# Patient Record
Sex: MALE | Race: AMERICAN INDIAN OR ALASKA NATIVE
[De-identification: names, ages, dates, MRNs, and addresses within clinical notes are randomized per-mention and may not be internally consistent; named-entity substitution may affect disease eponyms.]

---

## 2018-02-26 ENCOUNTER — HOSPITAL ENCOUNTER (EMERGENCY)
Dept: HOSPITAL 42 - ED | Age: 28
Discharge: HOME | End: 2018-02-26
Payer: COMMERCIAL

## 2018-02-26 VITALS — OXYGEN SATURATION: 100 % | HEART RATE: 65 BPM | SYSTOLIC BLOOD PRESSURE: 124 MMHG | DIASTOLIC BLOOD PRESSURE: 51 MMHG

## 2018-02-26 VITALS — TEMPERATURE: 97.9 F | RESPIRATION RATE: 18 BRPM

## 2018-02-26 DIAGNOSIS — J02.9: Primary | ICD-10-CM

## 2018-02-26 LAB
ALBUMIN SERPL-MCNC: 4.1 G/DL (ref 3–4.8)
ALBUMIN/GLOB SERPL: 1.3 {RATIO} (ref 1.1–1.8)
ALT SERPL-CCNC: 45 U/L (ref 7–56)
AST SERPL-CCNC: 41 U/L (ref 17–59)
BASOPHILS # BLD AUTO: 0.01 K/MM3 (ref 0–2)
BASOPHILS NFR BLD: 0.2 % (ref 0–3)
BUN SERPL-MCNC: 11 MG/DL (ref 7–21)
CALCIUM SERPL-MCNC: 9.7 MG/DL (ref 8.4–10.5)
EOSINOPHIL # BLD: 0.3 10*3/UL (ref 0–0.7)
EOSINOPHIL NFR BLD: 4 % (ref 1.5–5)
ERYTHROCYTE [DISTWIDTH] IN BLOOD BY AUTOMATED COUNT: 12.2 % (ref 11.5–14.5)
GFR NON-AFRICAN AMERICAN: > 60
GRANULOCYTES # BLD: 3.36 10*3/UL (ref 1.4–6.5)
GRANULOCYTES NFR BLD: 52.3 % (ref 50–68)
HGB BLD-MCNC: 13.1 G/DL (ref 14–18)
INFLUENZA A B: (no result)
LYMPHOCYTES # BLD: 2.3 10*3/UL (ref 1.2–3.4)
LYMPHOCYTES NFR BLD AUTO: 35.3 % (ref 22–35)
MCH RBC QN AUTO: 28.7 PG (ref 25–35)
MCHC RBC AUTO-ENTMCNC: 31 G/DL (ref 31–37)
MCV RBC AUTO: 92.6 FL (ref 80–105)
MONOCYTES # BLD AUTO: 0.5 10*3/UL (ref 0.1–0.6)
MONOCYTES NFR BLD: 8.2 % (ref 1–6)
PLATELET # BLD: 261 10^3/UL (ref 120–450)
PMV BLD AUTO: 11.5 FL (ref 7–11)
RBC # BLD AUTO: 4.57 10^6/UL (ref 3.5–6.1)
WBC # BLD AUTO: 6.4 10^3/UL (ref 4.5–11)

## 2018-02-26 PROCEDURE — 85025 COMPLETE CBC W/AUTO DIFF WBC: CPT

## 2018-02-26 PROCEDURE — 70450 CT HEAD/BRAIN W/O DYE: CPT

## 2018-02-26 PROCEDURE — 96361 HYDRATE IV INFUSION ADD-ON: CPT

## 2018-02-26 PROCEDURE — 87430 STREP A AG IA: CPT

## 2018-02-26 PROCEDURE — 80053 COMPREHEN METABOLIC PANEL: CPT

## 2018-02-26 PROCEDURE — 96374 THER/PROPH/DIAG INJ IV PUSH: CPT

## 2018-02-26 PROCEDURE — 99284 EMERGENCY DEPT VISIT MOD MDM: CPT

## 2018-02-26 PROCEDURE — 87804 INFLUENZA ASSAY W/OPTIC: CPT

## 2018-02-26 NOTE — ED PDOC
Arrival/HPI





- General


Chief Complaint: ENT Problem


Time Seen by Provider: 02/26/18 09:21


Historian: Patient





- History of Present Illness


Narrative History of Present Illness (Text): 





02/26/18 11:58


27-year-old male presents today with a one-week history of intermittent 

headaches. Patient states he has a history of headaches but he can't seem to 

get this headache to go away. Patient states the headache is throbbing and 

fluctuates from the frontal to the posterior to the right side of the head. 

Patient also states over the past 3-4 days he developed sore throat and 

subjective fevers at home. Patient complaining of chills and body aches. He 

denies dizziness or weakness. Denies abdominal pain. No nausea or vomiting. No 

other complaints. Positive sick contacts at home.





Past Medical History





- Provider Review


Nursing Documentation Reviewed: Yes





- Travel History


Have you recently traveled outside [place] w/in the past 3 mons?: No





- Infectious Disease


Hx of Infectious Diseases: None





- Tetanus Immunization


Tetanus Immunization: Unknown





- Pulmonary


Hx Asthma: Yes





- Psychiatric


Hx Depression: Yes


Hx Substance Use: No





- Anesthesia


Hx Anesthesia: No


Hx Anesthesia Reactions: No


Hx Malignant Hyperthermia: No





Family/Social History





- Physician Review


Nursing Documentation Reviewed: Yes


Family/Social History: Unknown Family HX


Smoking Status: Never Smoked


Hx Alcohol Use: No


Hx Substance Use: No





Allergies/Home Meds


Allergies/Adverse Reactions: 


Allergies





No Known Allergies Allergy (Verified 02/26/18 09:58)


 








Home Medications: 


 Home Meds











 Medication  Instructions  Recorded  Confirmed


 


Venlafaxine [Effexor] 1 tab PO DAILY 02/26/18 02/26/18














Review of Systems





- Review of Systems


Constitutional: Fevers.  absent: Fatigue


ENT: Sore Throat, Sinus Congestion


Respiratory: absent: SOB, Cough


Cardiovascular: absent: Chest Pain, Palpitations


Gastrointestinal: absent: Abdominal Pain, Nausea, Vomiting


Genitourinary Male: absent: Dysuria


Musculoskeletal: Arthralgias.  absent: Neck Pain


Skin: absent: Rash, Pruritis


Neurological: Headache.  absent: Dizziness


Psychiatric: absent: Anxiety, Depression





Physical Exam


Vital Signs Reviewed: Yes


Vital Signs











  Temp Pulse Resp BP Pulse Ox


 


 02/26/18 12:23   79  18  121/68  98


 


 02/26/18 11:10  97.9 F  86  18  124/71  97


 


 02/26/18 09:34  97.1 F L  96 H  20  126/78  98











Temperature: Afebrile


Blood Pressure: Normal


Pulse: Regular


Respiratory Rate: Normal


Appearance: Positive for: Well-Appearing, Non-Toxic, Comfortable


Pain Distress: None


Mental Status: Positive for: Alert and Oriented X 3





- Systems Exam


Head: Present: Atraumatic


Conjunctiva: Present: Normal


Ears: Present: Normal, NORMAL TM


Mouth: Present: Moist Mucous Membranes, Normal Lips, Normal Tounge.  No: 

Drooling, Trismus


Pharnyx: Present: ERYTHEMA.  No: EXUDATE, TONSILS ENLARGED, Peritonsilar 

Swelling, Uvular Deviation, Muffled/Hoarse Voice


Nose (External): Present: Atraumatic


Nose (Internal): Present: Normal Inspection, Clear Mucous


Neck: Present: Normal Range of Motion, Trachea Midline.  No: Meningeal Signs, 

Lymphadenopathy


Respiratory/Chest: Present: Clear to Auscultation, Good Air Exchange.  No: 

Respiratory Distress, Accessory Muscle Use


Cardiovascular: Present: Regular Rate and Rhythm, Normal S1, S2.  No: Murmurs


Abdomen: No: Tenderness, Rebound, Guarding


Back: Present: Normal Inspection


Upper Extremity: Present: Normal ROM


Lower Extremity: Present: Normal ROM


Neurological: Present: GCS=15, Speech Normal


Skin: Present: Warm, Dry, Normal Color.  No: Rashes


Psychiatric: Present: Alert, Oriented x 3





Medical Decision Making


ED Course and Treatment: 





02/26/18 12:00


Patient is nontoxic well-appearing. C/o bodyaches, sore throat and headache





toradol IM


NS iv bolus








rapid flu; negative


rapid strep; Positive





amoxicillin PO 





head ct; FINDINGS:


HEMORRHAGE:


No intracranial hemorrhage. 


BRAIN:


No mass effect or edema.  No atrophy or chronic microvascular ischemic changes.


VENTRICLES:


Unremarkable. No hydrocephalus. 


CALVARIUM:


Unremarkable.


PARANASAL SINUSES:


Unremarkable as visualized. No significant inflammatory changes.


MASTOID AIR CELLS:


Unremarkable as visualized. No inflammatory changes.


OTHER FINDINGS:


None.


IMPRESSION:


Normal CT of the Head.








Patient reassessment: Pt feeling better; vitals stable. 





discussed all results with patient. 








I advised follow up with primary care physician within the next 2 days. I 

advised increase fluids and return if symptoms worsen persist or if new 

symptoms develop








Patient verbalizes understanding of discharge instructions and need for 

immediate followup.








all aspects of this case were discussed the attending of record. 








IMPRESSION;pharyngitis


Motrin every 6 hours as needed for pain/fever reduction


Increase fluids


Amoxicillin 3 times daily x 10 days. 


Saltwater gargles, throat lozenges


Followup with primary care physician the next 2 days


Return if symptoms worsen persist or if new symptoms develop: Continued high 

fevers, dizziness, weakness, chest pain or shortness of breath vomiting/diarrhea

, or if any other concerning symptoms develop


02/26/18 13:48








- Lab Interpretations


Lab Results: 








 02/26/18 10:50 





 02/26/18 10:50 





 Lab Results





02/26/18 10:50: WBC 6.4, RBC 4.57, Hgb 13.1 L, Hct 42.3, MCV 92.6, MCH 28.7, 

MCHC 31.0, RDW 12.2, Plt Count 261, MPV 11.5 H, Gran % 52.3, Lymph % (Auto) 

35.3 H, Mono % (Auto) 8.2 H, Eos % (Auto) 4.0, Baso % (Auto) 0.2, Gran # 3.36, 

Lymph # (Auto) 2.3, Mono # (Auto) 0.5, Eos # (Auto) 0.3, Baso # (Auto) 0.01


02/26/18 10:50: Sodium 138, Potassium 4.0, Chloride 101, Carbon Dioxide 31, 

Anion Gap 11, BUN 11, Creatinine 1.4, Est GFR (African Amer) > 60, Est GFR (Non-

Af Amer) > 60, Random Glucose 88, Calcium 9.7, Total Bilirubin 1.0, AST 41, ALT 

45, Alkaline Phosphatase 55, Total Protein 7.3, Albumin 4.1, Globulin 3.2, 

Albumin/Globulin Ratio 1.3


02/26/18 10:35: Influenza Typ A,B (EIA) Negative for flu a/b, Grp A Beta Strep 

Ag Positive H











- RAD Interpretation


Radiology Orders: 








02/26/18 12:23


HEAD W/O CONTRAST [CT] Stat 














- Medication Orders


Current Medication Orders: 











Discontinued Medications





Amoxicillin (Amoxil 500 Mg Cap)  500 mg PO STAT STA


   PRN Reason: Protocol


   Stop: 02/26/18 11:37


   Last Admin: 02/26/18 12:00  Dose: 500 mg





Sodium Chloride (Sodium Chloride 0.9%)  1,000 mls @ 999 mls/hr IV .Q1H1M STA


   Stop: 02/26/18 11:29


   Last Admin: 02/26/18 10:57  Dose: 999 mls/hr





eMAR Start Stop


 Document     02/26/18 10:57  GMI  (Rec: 02/26/18 10:57  GMI  [place] [id number])


     Intravenous Solution


      Start Date                                 02/26/18


      Start Time                                 10:57


      End Date                                   02/26/18


      End time                                   12:26


      Total Infusion Time                        89





Ketorolac Tromethamine (Toradol)  30 mg IVP STAT STA


   Stop: 02/26/18 10:30


   Last Admin: 02/26/18 10:58  Dose: 30 mg





MAR Pain Assessment


 Document     02/26/18 10:58  GMI  (Rec: 02/26/18 10:58  GMI  [place] [id number])


     Pain Reassessment


      Is this a pain reassessment?               Yes


     Sleep


      Is patient sleeping during reassessment?   No


     Presence of Pain


      Presence of Pain                           Yes


     Pain Scale Used


      Pain Scale Used                            Numeric


     Location


      Upper or Lower                             Upper


      Pain Location Body Site                    Head


     Description


      Description                                Constant


      Pain Behavior                              Facial Grimacing


      Alleviating Factors/Management             Distraction


       Techniques                                


      Alleviating Factors                        Medication


IVP Administration


 Document     02/26/18 10:58  GMI  (Rec: 02/26/18 10:58  GMI  [place] [id number])


     Charges for Administration


      # of IVP Administrations                   1


Re-Assess: MAR Pain Assessment


 Document     02/26/18 11:58  GMI  (Rec: 02/26/18 12:27  GMI  [place] [id number])


     Pain Reassessment


      Is this a pain reassessment?               Yes


     Sleep


      Is patient sleeping during reassessment?   No


     Presence of Pain


      Presence of Pain                           Yes


     Pain Scale Used


      Pain Scale Used                            Numeric


     Location


      Upper or Lower                             Upper


      Pain Location Body Site                    Head


     Description


      Description                                Intermittent


      Intensity of Pain at present               5


      Alleviating Factors                        Position Change














Disposition/Present on Arrival





- Present on Arrival


Any Indicators Present on Arrival: No


History of DVT/PE: No


History of Uncontrolled Diabetes: No


Urinary Catheter: No


History of Decub. Ulcer: No


History Surgical Site Infection Following: None





- Disposition


Have Diagnosis and Disposition been Completed?: Yes


Diagnosis: 


 Pharyngitis





Disposition: HOME/ ROUTINE


Disposition Time: 13:49


Patient Plan: Discharge


Condition: GOOD


Discharge Instructions (ExitCare):  Sore Throat, Adult (DC)


Additional Instructions: 


Motrin every 6 hours as needed for pain/fever reduction


Increase fluids


Amoxicillin 3 times daily x 10 days. 


Saltwater gargles, throat lozenges


Followup with primary care physician the next 2 days


Return if symptoms worsen persist or if new symptoms develop: Continued high 

fevers, dizziness, weakness, chest pain or shortness of breath vomiting/diarrhea

, or if any other concerning symptoms develop


Prescriptions: 


Amoxicillin 500 mg PO TID #30 tab


Ibuprofen [Motrin] 600 mg PO Q6H PRN #20 tab


 PRN Reason: pain/fever reduction


Referrals: 


Pradeep Carson DO [[facility]] - Follow up with primary


Roseline Longoria MD [Staff Provider] - Follow up with primary


Forms:  [facility] [facility] (English), WORK NOTE

## 2018-02-26 NOTE — CT
PROCEDURE:  CT HEAD WITHOUT CONTRAST.



HISTORY:

headache x 1 week



COMPARISON:

None available. 



TECHNIQUE:

Axial computed tomography images were obtained through the head/brain 

without intravenous contrast.  



Radiation dose:



Total exam DLP = 839 mGy-cm.



This CT exam was performed using one or more of the following dose 

reduction techniques: Automated exposure control, adjustment of the 

mA and/or kV according to patient size, and/or use of iterative 

reconstruction technique.



FINDINGS:



HEMORRHAGE:

No intracranial hemorrhage. 



BRAIN:

No mass effect or edema.  No atrophy or chronic microvascular 

ischemic changes.



VENTRICLES:

Unremarkable. No hydrocephalus. 



CALVARIUM:

Unremarkable.



PARANASAL SINUSES:

Unremarkable as visualized. No significant inflammatory changes.



MASTOID AIR CELLS:

Unremarkable as visualized. No inflammatory changes.



OTHER FINDINGS:

None.



IMPRESSION:

Normal CT of the Head.

## 2019-01-08 ENCOUNTER — HOSPITAL ENCOUNTER (EMERGENCY)
Dept: HOSPITAL 42 - ED | Age: 29
Discharge: HOME | End: 2019-01-08
Payer: COMMERCIAL

## 2019-01-08 VITALS
SYSTOLIC BLOOD PRESSURE: 107 MMHG | DIASTOLIC BLOOD PRESSURE: 52 MMHG | RESPIRATION RATE: 18 BRPM | OXYGEN SATURATION: 96 %

## 2019-01-08 VITALS — HEART RATE: 97 BPM

## 2019-01-08 VITALS — TEMPERATURE: 98.2 F

## 2019-01-08 VITALS — BODY MASS INDEX: 25.8 KG/M2

## 2019-01-08 DIAGNOSIS — F19.10: Primary | ICD-10-CM

## 2019-01-08 NOTE — ED PDOC
Arrival/HPI





- General


Chief Complaint: Substance Abuse


Time Seen by Provider: 01/08/19 05:43


Historian: Patient, EMS





- History of Present Illness


Narrative History of Present Illness (Text): 





01/08/19 06:08


28 year old male, with no significant past medical history, presents to the 

emergency department status post heroin overdose.  Patient admits to using 

heroin through nasal inhalation.  EMS states narcan was given en route with 

positive result, patient is awake upon arrival.  Patient admits to occasional 

drug use including cocaine and marijuana as well.  Patient denies any fevers, 

headache, abdominal pain, chest pain, shortness of breath, or any other 

complaints. 





Time/Duration: Prior to Arrival


Symptom Onset: Gradual


Symptom Course: Improving


Activities at Onset: Light





Past Medical History





- Provider Review


Nursing Documentation Reviewed: Yes





- Infectious Disease


Hx of Infectious Diseases: None





- Tetanus Immunization


Tetanus Immunization: Unknown





- Pulmonary


Hx Asthma: Yes





- Psychiatric


Hx Depression: Yes


Hx Substance Use: No





- Anesthesia


Hx Anesthesia: No


Hx Anesthesia Reactions: No


Hx Malignant Hyperthermia: No





- Suicidal Assessment


Feels Threatened In Home Enviroment: No





Family/Social History





- Physician Review


Nursing Documentation Reviewed: Yes


Family/Social History: No Known Family HX


Smoking Status: Never Smoked


Hx Alcohol Use: No


Hx Substance Use: No





Allergies/Home Meds


Allergies/Adverse Reactions: 


Allergies





No Known Allergies Allergy (Unverified 01/08/19 05:48)


   








Home Medications: 


                                    Home Meds











 Medication  Instructions  Recorded  Confirmed


 


Venlafaxine [Effexor] 1 tab PO DAILY 02/26/18 01/08/19














Review of Systems





- Physician Review


All systems were reviewed & negative as marked: Yes





- Review of Systems


Constitutional: Other (Overdose).  absent: Fevers


Respiratory: absent: SOB


Cardiovascular: absent: Chest Pain


Gastrointestinal: absent: Abdominal Pain


Neurological: absent: Headache





Physical Exam


Vital Signs Reviewed: Yes





Vital Signs











  Temp Pulse Resp BP Pulse Ox


 


 01/08/19 05:50  98.2 F  109 H  22  118/81  89 L











Temperature: Afebrile


Blood Pressure: Normal


Pulse: Tachycardic


Respiratory Rate: Normal


Appearance: Positive for: Well-Appearing, Non-Toxic, Comfortable


Pain Distress: None


Mental Status: Positive for: Alert and Oriented X 3





- Systems Exam


Head: Present: Atraumatic, Normocephalic


Pupils: Present: PERRL, Other (eyes slightly dialated, yet reactive)


Extroacular Muscles: Present: EOMI


Conjunctiva: Present: Normal


Mouth: Present: Moist Mucous Membranes


Neck: Present: Normal Range of Motion


Respiratory/Chest: Present: Clear to Auscultation, Good Air Exchange.  No: 

Respiratory Distress, Accessory Muscle Use


Cardiovascular: Present: Regular Rate and Rhythm, Normal S1, S2.  No: Murmurs


Abdomen: No: Tenderness, Distention, Peritoneal Signs


Back: Present: Normal Inspection


Upper Extremity: Present: Normal Inspection.  No: Cyanosis, Edema


Lower Extremity: Present: Normal Inspection.  No: Edema


Neurological: Present: GCS=15, CN II-XII Intact, Speech Normal


Skin: Present: Warm, Dry, Normal Color.  No: Rashes


Psychiatric: Present: Alert, Oriented x 3, Normal Insight, Normal Concentration





Medical Decision Making


ED Course and Treatment: 





01/08/19 06:13


Impression: 28 year old male presents for evaluation status post heroin overdose





Plan:


-- Reassess and disposition





Prior Visits:


Notes and results from previous visits were reviewed.





Progress Notes: 


Patient is refusing blood work at this time.





01/08/19 06:46


Patient awake and alert.  Still refusing blood work.  Patient to be discharged 

with follow up instructions.





- Scribe Statement


The provider has reviewed the documentation as recorded by the Jesu Cloud





Provider Scribe Attestation:


All medical record entries made by the Casandraibcanelo were at my direction and 

personally dictated by me. I have reviewed the chart and agree that the record 

accurately reflects my personal performance of the history, physical exam, 

medical decision making, and the department course for this patient. I have also

 personally directed, reviewed, and agree with the discharge instructions and 

disposition.











Disposition/Present on Arrival





- Present on Arrival


Any Indicators Present on Arrival: No


History of DVT/PE: No


History of Uncontrolled Diabetes: No


Urinary Catheter: No


History of Decub. Ulcer: No


History Surgical Site Infection Following: None





- Disposition


Have Diagnosis and Disposition been Completed?: Yes


Diagnosis: 


 Drug abuse





Disposition: HOME/ ROUTINE


Disposition Time: 06:55


Patient Problems: 


                             Current Active Problems











Problem Status Onset


 


Drug abuse Acute 











Condition: IMPROVED


Discharge Instructions (ExitCare):  Drug Abuse and Drug Addiction (DC)


Additional Instructions: 





D[facility] P[facility]TM[facility], thank you for letting us take care of you today. The emergency 

medical care you received today was directed at your acute symptoms. If you were

 prescribed any medication, please fill it and take as directed. It may take 

several days for your symptoms to resolve. Return to the Emergency Department if

 your symptoms worsen, do not improve, or if you have any other problems.





Please contact your doctor or call one of the physicians/clinics you have been 

referred to that are listed on the Patient Visit Information form that is 

included in your discharge packet. Bring any paperwork you were given at 

discharge with you along with any medications you are taking to your follow up 

visit. Our treatment cannot replace ongoing medical care by a primary care 

provider outside of the emergency department.





Thank you for allowing the [facility] team to be part of your care today.




















Follow up with your primary care doctor or our clinic this week for re-

evaluation and further management.


Referrals: 


Pao Romo MD [Medical Doctor] - Follow up with primary


 Service [Outside] - Follow up with primary


Forms:  [employer] (English)